# Patient Record
(demographics unavailable — no encounter records)

---

## 2024-12-05 NOTE — PHYSICAL EXAM
[General Appearance - Alert] : alert [General Appearance - In No Acute Distress] : in no acute distress [Sclera] : the sclera and conjunctiva were normal [Outer Ear] : the ears and nose were normal in appearance [Neck Appearance] : the appearance of the neck was normal [Neck Cervical Mass (___cm)] : no neck mass was observed [Jugular Venous Distention Increased] : there was no jugular-venous distention [Thyroid Diffuse Enlargement] : the thyroid was not enlarged [Thyroid Nodule] : there were no palpable thyroid nodules [Auscultation Breath Sounds / Voice Sounds] : lungs were clear to auscultation bilaterally [Heart Rate And Rhythm] : heart rate was normal and rhythm regular [Heart Sounds] : normal S1 and S2 [Heart Sounds Gallop] : no gallops [Murmurs] : no murmurs [Heart Sounds Pericardial Friction Rub] : no pericardial rub [Edema] : there was no peripheral edema [Bowel Sounds] : normal bowel sounds [Abdomen Soft] : soft [Abdomen Tenderness] : non-tender [Abdomen Mass (___ Cm)] : no abdominal mass palpated [Cervical Lymph Nodes Enlarged Posterior Bilaterally] : posterior cervical [Cervical Lymph Nodes Enlarged Anterior Bilaterally] : anterior cervical [Skin Color & Pigmentation] : normal skin color and pigmentation [Skin Turgor] : normal skin turgor [] : no rash [Oriented To Time, Place, And Person] : oriented to person, place, and time [Impaired Insight] : insight and judgment were intact [Affect] : the affect was normal [FreeTextEntry1] : No synovitis; B/L elbows w/ mild tenderness, nornal ROM;  full ROM in all joints

## 2024-12-05 NOTE — HISTORY OF PRESENT ILLNESS
[Fatigue] : fatigue [Morning Stiffness] : morning stiffness [Myalgias] : myalgias [FreeTextEntry1] : 12/5/2024:  Still w/ B/L elbow pain, unchanged - occurs primarily at nights.  No swelling/erythema.  Otherwise feeling fine since last visit.  Fibromyalgia symptoms well controlled on current regimen.  No other new complaints. 5/15/2024:  Pt c/o pain in her B/L elbows - primarily at nights then resolves the rest of the day.  No swelling/erythema.  Otherwise feeling fine since last visit.  Fibromyalgia symptoms well controlled on current regimen.  No other new complaints. 1/11/2024:  Fibromyalgia symptoms somewhat worse w/ the current cold weather.  Otherwise, continues to feel much better overall.  Neck / B/L UE pain remains improved since she started going to a chiropractor.  CTS symptoms remain much improved s/p recent C-S injections.  No new complaints.  [Malar Facial Rash] : no malar facial rash [Skin Lesions] : no lesions [Oral Ulcers] : no oral ulcers [Dry Mouth] : no dry mouth [Joint Swelling] : no joint swelling [Joint Warmth] : no joint warmth [Joint Deformity] : no joint deformity [Decreased ROM] : no decreased range of motion [Muscle Weakness] : no muscle weakness [Muscle Spasms] : no muscle spasms [Muscle Cramping] : no muscle cramping [Dry Eyes] : no dry eyes

## 2024-12-05 NOTE — ASSESSMENT
[FreeTextEntry1] : 48 year old female with: 1) Diffuse pain and persistent fatigue, most consistent with fibromyalgia. Improved overall on combination therapy of Lyrica + Cymbalta,  - Reiterated importance of exercise.  - Cont Lyrica 150mg qAM, 300mg qPM and Cymbalta 60mg daily.  - sleep hygiene.  - Cont naproxen 500mg BID prn for "flares" 2) B/L CTS: resolved s/p prior corticosteroid injections.  - Cont wrist splints qhs 3) Right shoulder pain: most c/w RTC tendinopathy. currently much improved.  - Cont shoulder exercises  - Naproxen prn 4) Left hip pain: most c/w trochanteric bursitis: resolved s/p prior C-S injection 5) B/L elbow pain: previously was most c/w lateral epicondylitis, though currently w/ elbow joint tenderness of unclear etiology - ?overuse vs early OA   - X-rays of elbows.   - Recommended exercise.  - Minimize activities placing stress on the elbow  - Cont naproxen BID prn. 6) Neck pain radiating down B/L UE's, only at nights: most c/w neurologic etiology. Currently much improved.  - Cont neck exercises.  - Naproxen and/or Tylenol prn  - heating pads or ice packs  - OTC topical analgesics.